# Patient Record
Sex: MALE | Race: BLACK OR AFRICAN AMERICAN | Employment: FULL TIME | URBAN - METROPOLITAN AREA
[De-identification: names, ages, dates, MRNs, and addresses within clinical notes are randomized per-mention and may not be internally consistent; named-entity substitution may affect disease eponyms.]

---

## 2017-07-16 ENCOUNTER — APPOINTMENT (OUTPATIENT)
Dept: GENERAL RADIOLOGY | Age: 27
End: 2017-07-16
Attending: EMERGENCY MEDICINE
Payer: COMMERCIAL

## 2017-07-16 ENCOUNTER — HOSPITAL ENCOUNTER (EMERGENCY)
Age: 27
Discharge: HOME | End: 2017-07-16
Attending: EMERGENCY MEDICINE | Admitting: EMERGENCY MEDICINE
Payer: COMMERCIAL

## 2017-07-16 VITALS
TEMPERATURE: 98.3 F | BODY MASS INDEX: 21.97 KG/M2 | OXYGEN SATURATION: 100 % | HEART RATE: 68 BPM | DIASTOLIC BLOOD PRESSURE: 66 MMHG | RESPIRATION RATE: 15 BRPM | WEIGHT: 140 LBS | SYSTOLIC BLOOD PRESSURE: 142 MMHG | HEIGHT: 67 IN

## 2017-07-16 DIAGNOSIS — S93.402A SPRAIN OF LEFT ANKLE, UNSPECIFIED LIGAMENT, INITIAL ENCOUNTER: Primary | ICD-10-CM

## 2017-07-16 PROCEDURE — 73610 X-RAY EXAM OF ANKLE: CPT

## 2017-07-16 PROCEDURE — 99282 EMERGENCY DEPT VISIT SF MDM: CPT

## 2017-07-16 NOTE — ED PROVIDER NOTES
HPI Comments: The patient is a 24 y.o. male who presents with left proximal ankle pain. 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 He denies any previous ankle injuries in the past.     The history is provided by the patient. No  was used. History reviewed. No pertinent past medical history. History reviewed. No pertinent surgical history. History reviewed. No pertinent family history. Social History     Social History    Marital status: SINGLE     Spouse name: N/A    Number of children: N/A    Years of education: N/A     Occupational History    Not on file. Social History Main Topics    Smoking status: Never Smoker    Smokeless tobacco: Never Used    Alcohol use No    Drug use: No    Sexual activity: Not on file     Other Topics Concern    Not on file     Social History Narrative         ALLERGIES: Review of patient's allergies indicates no known allergies. Review of Systems   Constitutional: Negative for activity change, appetite change, chills, diaphoresis, fatigue, fever and unexpected weight change. Musculoskeletal: Positive for arthralgias. Negative for back pain, gait problem, joint swelling, myalgias, neck pain and neck stiffness. Skin: Negative for color change, pallor, rash and wound. Vitals:    07/16/17 1728   BP: 142/66   Pulse: 68   Resp: 15   Temp: 98.3 °F (36.8 °C)   SpO2: 100%   Weight: 63.5 kg (140 lb)   Height: 5' 7\" (1.702 m)            Physical Exam   Constitutional: He is oriented to person, place, and time. He appears well-developed and well-nourished. Alert and appropriate in no apparent marked discomfort or acute respiratory distress   HENT:   Head: Normocephalic and atraumatic. Right Ear: External ear normal.   Left Ear: External ear normal.   Mouth/Throat: Oropharynx is clear and moist. No oropharyngeal exudate.    Eyes: Conjunctivae and EOM are normal. Pupils are equal, round, and reactive to light. Right eye exhibits no discharge. Left eye exhibits no discharge. No scleral icterus. Neck: Normal range of motion. Neck supple. No tracheal deviation present. No thyromegaly present. Cardiovascular: Normal rate, regular rhythm, normal heart sounds and intact distal pulses. No murmur heard. Pulmonary/Chest: Effort normal and breath sounds normal. No respiratory distress. He has no wheezes. He has no rales. Abdominal: Soft. Bowel sounds are normal. He exhibits no distension. There is no tenderness. There is no rebound and no guarding. Musculoskeletal: Normal range of motion. He exhibits no edema or tenderness. Moderate medial and mild lateral tenderness of left ankle without marked edema or point bony tenderness; no decreased ROM; otherwise neurovascular intact with no mid-foot; proximal fibula or toe tenderness; sensation to light touch intact; +2 pulses and symmetrical; no skin changes or lacerations; no warmth   Lymphadenopathy:     He has no cervical adenopathy. Neurological: He is alert and oriented to person, place, and time. No cranial nerve deficit. Coordination normal.   Skin: Skin is warm. No rash noted. No erythema. Psychiatric: He has a normal mood and affect. His behavior is normal. Judgment and thought content normal.   Nursing note and vitals reviewed. MDM  Number of Diagnoses or Management Options  Sprain of left ankle, unspecified ligament, initial encounter:   Diagnosis management comments: Patient with left ankle discomfort associated with martial arts without radiographic evidence of fracture or focal neurovascular deficit. No associate laceration or joint instability. The patient understands that a subtle or occult bony or ligamentous injury cannot be entirely excluded and will return for any worsening or failure to respond to symptomatic treatment. Precautions given.            Amount and/or Complexity of Data Reviewed  Tests in the radiology section of CPT®: ordered and reviewed    Risk of Complications, Morbidity, and/or Mortality  Presenting problems: moderate  Diagnostic procedures: moderate  Management options: moderate    Patient Progress  Patient progress: stable    ED Course       Procedures      Vitals:  Patient Vitals for the past 12 hrs:   Temp Pulse Resp BP SpO2   07/16/17 1728 98.3 °F (36.8 °C) 68 15 142/66 100 %       Medications ordered:   Medications - No data to display        X-Ray, CT or other radiology findings or impressions:  XR ANKLE LT MIN 3 V    (Results Pending)   XR Ankle Left:   No acute fracture or dislocation. Small rounded dentist. Does not appear to be acute in the anterior ankle. Interpreted by Janis Suggs MD      Disposition:  Diagnosis:   1. Sprain of left ankle, unspecified ligament, initial encounter        Disposition: Discharge Home    Follow-up Information     Follow up With Details Comments [facility] EMERGENCY DEPT  As needed, If symptoms worsen [address] [place] [address] 51    Vero Gordon MD In 1 week As needed, If symptoms are not resolving [address] [address]  [phone]             Patient's Medications   Start Taking    No medications on file   Continue Taking    No medications on file   These Medications have changed    No medications on file   Stop Taking    FLUTICASONE (FLONASE) 50 MCG/ACTUATION NASAL SPRAY    One spray in each nostril daily PRN congestion    HYDROCODONE-ACETAMINOPHEN (NORCO) 5-325 MG PER TABLET    Take 1-2 tablets PO every 4-6 hours as needed for pain control. If over the counter ibuprofen or acetaminophen was suggested, then only take the vicodin for pain not well controlled with the over the counter medication.          Scribe 55 Zahra Osei Keith  acting as a scribe for and in the presence of Janis Suggs MD      July 16, 2017 at 5:39 PM       Provider Attestation:      I personally performed the services described in the documentation, reviewed the documentation, as recorded by the scribe in my presence, and it accurately and completely records my words and actions.      Austyn Goldstein MD      Signed by: Samy Tyson, July 16, 2017 at 5:39 PM

## 2017-07-16 NOTE — DISCHARGE INSTRUCTIONS
May use Tylenol and Motrin  Ice to affected area  NO IMPACT EXERCISE OR SPORTS UNTIL PAIN IS GONE  Return immediately for increasing pain, fever, swelling, disability, discoloration, or any other concerns           Ankle Sprain: Rehab Exercises  Your Care Instructions  Here are some examples of typical rehabilitation exercises for your condition. Start each exercise slowly. Ease off the exercise if you start to have pain. Your doctor or physical therapist will tell you when you can start these exercises and which ones will work best for you. How to do the exercises  \"Alphabet\" exercise    1. Trace the alphabet with your toe. This helps your ankle move in all directions. Side-to-side knee swing exercise    1. Sit in a chair with your foot flat on the floor. 2. Slowly move your knee from side to side. Keep your foot pressed flat. 3. Continue this exercise for 2 to 3 minutes. Towel curl    1. While sitting, place your foot on a towel on the floor. Scrunch the towel toward you with your toes. 2. Then use your toes to push the towel away from you. 3. To make this exercise more challenging you can put something on the other end of the towel. A can of soup is about the right weight for this. Towel stretch    1. Sit with your legs extended and knees straight. 2. Place a towel around your foot just under the toes. 3. Hold each end of the towel in each hand, with your hands above your knees. 4. Pull back with the towel so that your foot stretches toward you. 5. Hold the position for at least 15 to 30 seconds. 6. Repeat 2 to 4 times a session. Do up to 5 sessions a day. Ankle eversion exercise    1. Start by sitting with your foot flat on the floor. Push your foot outward against a wall or a piece of furniture that doesn't move. Hold for about 6 seconds, and relax. Repeat 8 to 12 times. 2. After you feel comfortable with this, try using rubber tubing looped around the outside of your feet for resistance.  Push your foot out to the side against the tubing, and then count to 10 as you slowly bring your foot back to the middle. Repeat 8 to 12 times. Isometric opposition exercises    1. While sitting, put your feet together flat on the floor. 2. Press your injured foot inward against your other foot. Hold for about 6 seconds, and relax. Repeat 8 to 12 times. 3. Then place the heel of your other foot on top of the injured one. Push down with the top heel while trying to push up with your injured foot. Hold for about 6 seconds, and relax. Repeat 8 to 12 times. Resisted ankle inversion    1. Sit on the floor with your good leg crossed over your other leg. 2. Hold both ends of an exercise band and loop the band around the inside of your affected foot. Then press your other foot against the band. 3. Keeping your legs crossed, slowly push your affected foot against the band so that foot moves away from your other foot. Then slowly relax. 4. Repeat 8 to 12 times. Resisted ankle eversion    1. Sit on the floor with your legs straight. 2. Hold both ends of an exercise band and loop the band around the outside of your affected foot. Then press your other foot against the band. 3. Keeping your leg straight, slowly push your affected foot outward against the band and away from your other foot without letting your leg rotate. Then slowly relax. 4. Repeat 8 to 12 times. Resisted ankle dorsiflexion    1. Tie the ends of an exercise band together to form a loop. Attach one end of the loop to a secure object or shut a door on it to hold it in place. (Or you can have someone hold one end of the loop to provide resistance.)  2. While sitting on the floor or in a chair, loop the other end of the band over the top of your affected foot. 3. Keeping your knee and leg straight, slowly flex your foot to pull back on the exercise band, and then slowly relax. 4. Repeat 8 to 12 times. Single-leg balance    1.  Stand on a flat surface with your arms stretched out to your sides like you are making the letter \"T. \" Then lift your good leg off the floor, bending it at the knee. If you are not steady on your feet, use one hand to hold on to a chair, counter, or wall. 2. Standing on the leg with your affected ankle, keep that knee straight. Try to balance on that leg for up to 30 seconds. Then rest for up to 10 seconds. 3. Repeat 6 to 8 times. 4. When you can balance on your affected leg for 30 seconds with your eyes open, try to balance on it with your eyes closed. When you can do this exercise with your eyes closed for 30 seconds and with ease and no pain, try standing on a pillow or piece of foam, and repeat steps 1 through 4. Follow-up care is a key part of your treatment and safety. Be sure to make and go to all appointments, and call your doctor if you are having problems. It's also a good idea to know your test results and keep a list of the medicines you take. Where can you learn more? Go to http://keyana-marcia.info/. Mulugeta Balbuena in the search box to learn more about \"Ankle Sprain: Rehab Exercises. \"  Current as of: March 21, 2017  Content Version: 11.3  © 7108-0903 [employer]. Care instructions adapted under license by [employer] (which disclaims liability or warranty for this information). If you have questions about a medical condition or this instruction, always ask your healthcare professional. [place] [address] any warranty or liability for your use of this information.

## 2017-07-16 NOTE — ED TRIAGE NOTES
Pt with no ambulatory limitations,  Able to move ankles in circles, \"i think I hurt it doing [place]. \"

## 2018-01-04 ENCOUNTER — APPOINTMENT (OUTPATIENT)
Dept: CT IMAGING | Age: 28
End: 2018-01-04
Attending: PHYSICIAN ASSISTANT
Payer: COMMERCIAL

## 2018-01-04 ENCOUNTER — HOSPITAL ENCOUNTER (EMERGENCY)
Age: 28
Discharge: HOME | End: 2018-01-04
Attending: EMERGENCY MEDICINE
Payer: COMMERCIAL

## 2018-01-04 VITALS
HEART RATE: 91 BPM | BODY MASS INDEX: 25.46 KG/M2 | WEIGHT: 168 LBS | HEIGHT: 68 IN | TEMPERATURE: 98.1 F | SYSTOLIC BLOOD PRESSURE: 118 MMHG | OXYGEN SATURATION: 98 % | DIASTOLIC BLOOD PRESSURE: 73 MMHG | RESPIRATION RATE: 16 BRPM

## 2018-01-04 DIAGNOSIS — R10.84 ABDOMINAL PAIN, GENERALIZED: Primary | ICD-10-CM

## 2018-01-04 LAB
ALBUMIN SERPL-MCNC: 4.6 G/DL (ref 3.4–5)
ALBUMIN/GLOB SERPL: 1.4 {RATIO} (ref 0.8–1.7)
ALP SERPL-CCNC: 67 U/L (ref 45–117)
ALT SERPL-CCNC: 47 U/L (ref 16–61)
ANION GAP SERPL CALC-SCNC: 10 MMOL/L (ref 3–18)
APPEARANCE UR: CLEAR
AST SERPL-CCNC: 25 U/L (ref 15–37)
BACTERIA URNS QL MICRO: NEGATIVE /HPF
BASOPHILS # BLD: 0 K/UL (ref 0–0.06)
BASOPHILS NFR BLD: 0 % (ref 0–2)
BILIRUB SERPL-MCNC: 0.8 MG/DL (ref 0.2–1)
BILIRUB UR QL: NEGATIVE
BUN SERPL-MCNC: 18 MG/DL (ref 7–18)
BUN/CREAT SERPL: 15 (ref 12–20)
CALCIUM SERPL-MCNC: 9.3 MG/DL (ref 8.5–10.1)
CHLORIDE SERPL-SCNC: 105 MMOL/L (ref 100–108)
CO2 SERPL-SCNC: 26 MMOL/L (ref 21–32)
COLOR UR: YELLOW
CREAT SERPL-MCNC: 1.21 MG/DL (ref 0.6–1.3)
DIFFERENTIAL METHOD BLD: ABNORMAL
EOSINOPHIL # BLD: 0 K/UL (ref 0–0.4)
EOSINOPHIL NFR BLD: 0 % (ref 0–5)
EPITH CASTS URNS QL MICRO: NEGATIVE /LPF (ref 0–5)
ERYTHROCYTE [DISTWIDTH] IN BLOOD BY AUTOMATED COUNT: 12.7 % (ref 11.6–14.5)
GLOBULIN SER CALC-MCNC: 3.4 G/DL (ref 2–4)
GLUCOSE SERPL-MCNC: 99 MG/DL (ref 74–99)
GLUCOSE UR STRIP.AUTO-MCNC: NEGATIVE MG/DL
HCT VFR BLD AUTO: 46.5 % (ref 36–48)
HGB BLD-MCNC: 15.9 G/DL (ref 13–16)
HGB UR QL STRIP: NEGATIVE
KETONES UR QL STRIP.AUTO: 40 MG/DL
LEUKOCYTE ESTERASE UR QL STRIP.AUTO: NEGATIVE
LIPASE SERPL-CCNC: 81 U/L (ref 73–393)
LYMPHOCYTES # BLD: 0.4 K/UL (ref 0.9–3.6)
LYMPHOCYTES NFR BLD: 5 % (ref 21–52)
MCH RBC QN AUTO: 29.3 PG (ref 24–34)
MCHC RBC AUTO-ENTMCNC: 34.2 G/DL (ref 31–37)
MCV RBC AUTO: 85.8 FL (ref 74–97)
MONOCYTES # BLD: 0.3 K/UL (ref 0.05–1.2)
MONOCYTES NFR BLD: 4 % (ref 3–10)
NEUTS SEG # BLD: 8.1 K/UL (ref 1.8–8)
NEUTS SEG NFR BLD: 91 % (ref 40–73)
NITRITE UR QL STRIP.AUTO: NEGATIVE
PH UR STRIP: >8.5 [PH] (ref 5–8)
PLATELET # BLD AUTO: 150 K/UL (ref 135–420)
PMV BLD AUTO: 11 FL (ref 9.2–11.8)
POTASSIUM SERPL-SCNC: 4 MMOL/L (ref 3.5–5.5)
PROT SERPL-MCNC: 8 G/DL (ref 6.4–8.2)
PROT UR STRIP-MCNC: ABNORMAL MG/DL
RBC # BLD AUTO: 5.42 M/UL (ref 4.7–5.5)
RBC #/AREA URNS HPF: NORMAL /HPF (ref 0–5)
SODIUM SERPL-SCNC: 141 MMOL/L (ref 136–145)
SP GR UR REFRACTOMETRY: >1.03 (ref 1–1.03)
UROBILINOGEN UR QL STRIP.AUTO: 1 EU/DL (ref 0.2–1)
WBC # BLD AUTO: 8.9 K/UL (ref 4.6–13.2)
WBC URNS QL MICRO: NORMAL /HPF (ref 0–4)

## 2018-01-04 PROCEDURE — 81001 URINALYSIS AUTO W/SCOPE: CPT | Performed by: PHYSICIAN ASSISTANT

## 2018-01-04 PROCEDURE — 80053 COMPREHEN METABOLIC PANEL: CPT | Performed by: PHYSICIAN ASSISTANT

## 2018-01-04 PROCEDURE — 83690 ASSAY OF LIPASE: CPT | Performed by: PHYSICIAN ASSISTANT

## 2018-01-04 PROCEDURE — 74011250636 HC RX REV CODE- 250/636: Performed by: PHYSICIAN ASSISTANT

## 2018-01-04 PROCEDURE — 74177 CT ABD & PELVIS W/CONTRAST: CPT

## 2018-01-04 PROCEDURE — 96374 THER/PROPH/DIAG INJ IV PUSH: CPT

## 2018-01-04 PROCEDURE — 99284 EMERGENCY DEPT VISIT MOD MDM: CPT

## 2018-01-04 PROCEDURE — 96361 HYDRATE IV INFUSION ADD-ON: CPT

## 2018-01-04 PROCEDURE — 85025 COMPLETE CBC W/AUTO DIFF WBC: CPT | Performed by: PHYSICIAN ASSISTANT

## 2018-01-04 PROCEDURE — 74011636320 HC RX REV CODE- 636/320: Performed by: EMERGENCY MEDICINE

## 2018-01-04 RX ORDER — ONDANSETRON 2 MG/ML
4 INJECTION INTRAMUSCULAR; INTRAVENOUS
Status: COMPLETED | OUTPATIENT
Start: 2018-01-04 | End: 2018-01-04

## 2018-01-04 RX ORDER — ONDANSETRON 4 MG/1
TABLET, ORALLY DISINTEGRATING ORAL
Qty: 10 TAB | Refills: 0 | Status: SHIPPED | OUTPATIENT
Start: 2018-01-04

## 2018-01-04 RX ADMIN — SODIUM CHLORIDE 1000 ML: 9 INJECTION, SOLUTION INTRAVENOUS at 17:41

## 2018-01-04 RX ADMIN — ONDANSETRON 4 MG: 2 INJECTION INTRAMUSCULAR; INTRAVENOUS at 17:42

## 2018-01-04 RX ADMIN — IOPAMIDOL 94 ML: 612 INJECTION, SOLUTION INTRAVENOUS at 18:59

## 2018-01-04 NOTE — ED PROVIDER NOTES
EMERGENCY DEPARTMENT HISTORY AND PHYSICAL EXAM    5:20 PM      Date: 1/4/2018  Patient Name: Delia Koyanagi. History of Presenting Illness     Chief Complaint   Patient presents with    Vomiting    Diarrhea         History Provided By: Patient    Chief Complaint: abdominal pain  Duration:  Hours  Timing:  Constant  Location: lower abdomen  Quality: Cramping  Severity: Mild  Modifying Factors: eating made worse  Associated Symptoms: nausea, vomiting      Additional History (Context): Delia Koyanagi. is a 24 y.o. male with No significant past medical history and non smoker who presents with abdominal pain that started this morning. States sharp cramping pain. States has nausea, vomiting,diarrhea. Denies fever. States ate eggs and orange juice this morning. Denies previous abdominal problems. [place] PCP: Pam Mejia MD    Current Facility-Administered Medications   Medication Dose Route Frequency Provider Last Rate Last Dose    sodium chloride 0.9 % bolus infusion 1,000 mL  1,000 mL IntraVENous ONCE LAYNE Dean        ondansetron [facility] injection 4 mg  4 mg IntraVENous NOW LAYNE Dean           Past History     Past Medical History:  History reviewed. No pertinent past medical history. Past Surgical History:  History reviewed. No pertinent surgical history. Family History:  History reviewed. No pertinent family history. Social History:  Social History   Substance Use Topics    Smoking status: Never Smoker    Smokeless tobacco: Never Used    Alcohol use No       Allergies:  No Known Allergies      Review of Systems     Constitutional:  Denies malaise, fever, chills. Head:  Denies injury. Face:  Denies injury or pain. ENMT:  Denies sore throat. Neck:  Denies injury or pain. Chest:  Denies injury. Cardiac:  Denies chest pain or palpitations. Respiratory:  Denies cough, wheezing, difficulty breathing, shortness of breath.    GI/ABD:   pain,  nausea, vomiting, diarrhea. :  Denies injury, pain, dysuria or urgency. Back:  Denies injury or pain. Pelvis:  Denies injury or pain. Extremity/MS:  Denies injury or pain. Neuro:  Denies headache, LOC, dizziness, neurologic symptoms/deficits/paresthesias. Skin: Denies injury, rash, itching or skin changes. Physical Exam     Visit Vitals    /72 (BP 1 Location: Right arm, BP Patient Position: At rest)    Pulse 98    Temp 98.1 °F (36.7 °C)    Resp 18    Ht 5' 8\" (1.727 m)    Wt 76.2 kg (168 lb)    SpO2 100%    BMI 25.54 kg/m2       CONSTITUTIONAL: Alert, in no apparent distress; well-developed; well-nourished. HEAD:  Normocephalic, atraumatic. EYES: PERRL; EOM's intact. ENTM: Nose: No rhinorrhea; Throat: mucous membranes moist. Posterior pharynx-normal.  Neck:  No JVD, supple without lymphadenopathy. RESP: Chest clear, equal breath sounds. CV: S1 and S2 WNL; No murmurs, gallops or rubs. GI: Abdomen soft and mild generalized tenderness, +BS, NG, NR  No masses or organomegaly. UPPER EXT:  Normal inspection. LOWER EXT: Normal inspection. NEURO: strength 5/5 and sym, sensation intact. SKIN: No rashes; Normal for age and stage. PSYCH:  Alert and oriented, normal affect. Diagnostic Study Results     Labs -  No results found for this or any previous visit (from the past 12 hour(s)). Radiologic Studies -   No orders to display         Medical Decision Making   I am the first provider for this patient. I reviewed the vital signs, available nursing notes, past medical history, past surgical history, family history and social history. Vital Signs-Reviewed the patient's vital signs.     Pulse Oximetry Analysis -  100 on room air (Interpretation)      Records Reviewed: Nursing Notes and Old Medical Records (Time of Review: 5:20 PM)    ED Course: Progress Notes, Reevaluation, and Consults:  6:13 PM    Consulted with LAYNE Coates concerning patient Farhad Cao., standard discussion of reason for visit, HPI, ROS, PE, and current results available. Report was given at this time and pt was turned over to the provider above, who will assume care of pt at this time and disposition. LAYNE Garcia     Provider Notes (Medical Decision Making):   DDx: gastroenteritis, GERD, hernia, hepatitis, pancreatitis, gallbladder etiology, constipation, adhesions, UTI, pyelo, kidney stones, STD, appendicitis, diverticulitis, SBO, GI bleed, mesenteric ischemia, AAA, cardiac etiology, musculoskeletal pain/spasm, malignancy    6:00 PM  7:30 PM :Pt care assumed from Manish Clay PA-C , ED provider. Pt complaint(s), current treatment plan, progression and available diagnostic results have been discussed thoroughly. Rounding occurred: no  Intended Disposition: Home   Pending diagnostic reports and/or labs (please list): CT abdomen, labs    Doron Urbina PA-C     7:30 PM  Reviewed labs, CT ab/pelvis reviewed and unremarkable  Wbc 8.9, lipase 81, normal lytes. ua clear  CT without obstruction, acute process   Will d/c home at thsi time. Pt is not vomiting, denies pain and here with family to take home. Pt will f/u with GI and pcp and return if worse or as needed        Diagnosis     Clinical Impression: No diagnosis found.   _______________________________

## 2018-01-04 NOTE — ED TRIAGE NOTES
Pt arrived via EMS from home with reports of N/V/D about 3-4 episodes that began about 30 mins after eating eggs this morning approx 0830. Pt has no PMH, no meds. C/o back pain. No reports of urinary problems.

## 2018-01-05 NOTE — DISCHARGE INSTRUCTIONS
Abdominal Pain: Care Instructions  Your Care Instructions    Abdominal pain has many possible causes. Some aren't serious and get better on their own in a few days. Others need more testing and treatment. If your pain continues or gets worse, you need to be rechecked and may need more tests to find out what is wrong. You may need surgery to correct the problem. Don't ignore new symptoms, such as fever, nausea and vomiting, urination problems, pain that gets worse, and dizziness. These may be signs of a more serious problem. Your doctor may have recommended a follow-up visit in the next 8 to 12 hours. If you are not getting better, you may need more tests or treatment. The doctor has checked you carefully, but problems can develop later. If you notice any problems or new symptoms, get medical treatment right away. Follow-up care is a key part of your treatment and safety. Be sure to make and go to all appointments, and call your doctor if you are having problems. It's also a good idea to know your test results and keep a list of the medicines you take. How can you care for yourself at home? · Rest until you feel better. · To prevent dehydration, drink plenty of fluids, enough so that your urine is light yellow or clear like water. Choose water and other caffeine-free clear liquids until you feel better. If you have kidney, heart, or liver disease and have to limit fluids, talk with your doctor before you increase the amount of fluids you drink. · If your stomach is upset, eat mild foods, such as rice, dry toast or crackers, bananas, and applesauce. Try eating several small meals instead of two or three large ones. · Wait until 48 hours after all symptoms have gone away before you have spicy foods, alcohol, and drinks that contain caffeine. · Do not eat foods that are high in fat. · Avoid anti-inflammatory medicines such as aspirin, ibuprofen (Advil, Motrin), and naproxen (Aleve).  These can cause stomach upset. Talk to your doctor if you take daily aspirin for another health problem. When should you call for help? Call 911 anytime you think you may need emergency care. For example, call if:  ? · You passed out (lost consciousness). ? · You pass maroon or very bloody stools. ? · You vomit blood or what looks like coffee grounds. ? · You have new, severe belly pain. ?Call your doctor now or seek immediate medical care if:  ? · Your pain gets worse, especially if it becomes focused in one area of your belly. ? · You have a new or higher fever. ? · Your stools are black and look like tar, or they have streaks of blood. ? · You have unexpected vaginal bleeding. ? · You have symptoms of a urinary tract infection. These may include:  ¨ Pain when you urinate. ¨ Urinating more often than usual.  ¨ Blood in your urine. ? · You are dizzy or lightheaded, or you feel like you may faint. ? Watch closely for changes in your health, and be sure to contact your doctor if:  ? · You are not getting better after 1 day (24 hours). Where can you learn more? Go to http://keyana-marcia.info/. Enter X872 in the search box to learn more about \"Abdominal Pain: Care Instructions. \"  Current as of: March 20, 2017  Content Version: 11.4  © 8742-1591 [employer]. Care instructions adapted under license by [employer] (which disclaims liability or warranty for this information). If you have questions about a medical condition or this instruction, always ask your healthcare professional. [place] [address] any warranty or liability for your use of this information.

## 2018-08-24 ENCOUNTER — HOSPITAL ENCOUNTER (EMERGENCY)
Age: 28
Discharge: HOME | End: 2018-08-24
Attending: EMERGENCY MEDICINE
Payer: COMMERCIAL

## 2018-08-24 VITALS
TEMPERATURE: 98.1 F | OXYGEN SATURATION: 100 % | DIASTOLIC BLOOD PRESSURE: 72 MMHG | RESPIRATION RATE: 15 BRPM | HEART RATE: 60 BPM | SYSTOLIC BLOOD PRESSURE: 120 MMHG

## 2018-08-24 DIAGNOSIS — S16.1XXA STRAIN OF NECK MUSCLE, INITIAL ENCOUNTER: ICD-10-CM
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
## 2018-08-24 PROCEDURE — 99282 EMERGENCY DEPT VISIT SF MDM: CPT
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
## 2018-08-24 NOTE — LETTER
NOTIFICATION RETURN TO WORK / SCHOOL 
 
8/24/2018 6:40 PM 
 
Mr. Javier Smith. 
[address] [facility] [id number] To Whom It May Concern: 
 
Javier Smith. is currently under the care of [facility] EMERGENCY DEPT. He will return to work/school on: 8/26/18 If there are questions or concerns please have the patient contact our office. Sincerely, Nacho Tellez PA-C

## 2023-06-01 ENCOUNTER — OFFICE VISIT (OUTPATIENT)
Age: 33
End: 2023-06-01
Payer: COMMERCIAL

## 2023-06-01 ENCOUNTER — HOSPITAL ENCOUNTER (OUTPATIENT)
Facility: HOSPITAL | Age: 33
Setting detail: SPECIMEN
Discharge: HOME | End: 2023-06-01
Payer: COMMERCIAL

## 2023-06-01 ENCOUNTER — TELEPHONE (OUTPATIENT)
Age: 33
End: 2023-06-01

## 2023-06-01 VITALS
DIASTOLIC BLOOD PRESSURE: 65 MMHG | BODY MASS INDEX: 24.4 KG/M2 | WEIGHT: 151.8 LBS | TEMPERATURE: 98 F | HEIGHT: 66 IN | SYSTOLIC BLOOD PRESSURE: 116 MMHG | HEART RATE: 82 BPM | OXYGEN SATURATION: 98 % | RESPIRATION RATE: 16 BRPM

## 2023-06-01 DIAGNOSIS — Z11.4 ENCOUNTER FOR SCREENING FOR HIV: ICD-10-CM

## 2023-06-01 DIAGNOSIS — E11.9 TYPE 2 DIABETES MELLITUS WITHOUT COMPLICATION, WITHOUT LONG-TERM CURRENT USE OF INSULIN (HCC): ICD-10-CM

## 2023-06-01 DIAGNOSIS — Z11.59 ENCOUNTER FOR HEPATITIS C SCREENING TEST FOR LOW RISK PATIENT: ICD-10-CM

## 2023-06-01 DIAGNOSIS — Z00.00 WELL ADULT EXAM: Primary | ICD-10-CM

## 2023-06-01 DIAGNOSIS — Z13.220 SCREENING FOR HYPERLIPIDEMIA: ICD-10-CM

## 2023-06-01 DIAGNOSIS — Z00.00 WELL ADULT EXAM: ICD-10-CM

## 2023-06-01 LAB
ALBUMIN SERPL-MCNC: 4.1 G/DL (ref 3.4–5)
ALBUMIN/GLOB SERPL: 1.5 (ref 0.8–1.7)
ALP SERPL-CCNC: 76 U/L (ref 45–117)
ALT SERPL-CCNC: 34 U/L (ref 16–61)
ANION GAP SERPL CALC-SCNC: 7 MMOL/L (ref 3–18)
AST SERPL-CCNC: 13 U/L (ref 10–38)
BILIRUB SERPL-MCNC: 0.9 MG/DL (ref 0.2–1)
BUN SERPL-MCNC: 12 MG/DL (ref 7–18)
BUN/CREAT SERPL: 11 (ref 12–20)
CALCIUM SERPL-MCNC: 9.4 MG/DL (ref 8.5–10.1)
CHLORIDE SERPL-SCNC: 97 MMOL/L (ref 100–111)
CHOLEST SERPL-MCNC: 193 MG/DL
CO2 SERPL-SCNC: 28 MMOL/L (ref 21–32)
CREAT SERPL-MCNC: 1.12 MG/DL (ref 0.6–1.3)
ERYTHROCYTE [DISTWIDTH] IN BLOOD BY AUTOMATED COUNT: 11.9 % (ref 11.6–14.5)
EST. AVERAGE GLUCOSE BLD GHB EST-MCNC: ABNORMAL MG/DL
GLOBULIN SER CALC-MCNC: 2.7 G/DL (ref 2–4)
GLUCOSE SERPL-MCNC: 479 MG/DL (ref 74–99)
HBA1C MFR BLD: >14 % (ref 4.2–5.6)
HCT VFR BLD AUTO: 42.5 % (ref 36–48)
HDLC SERPL-MCNC: 59 MG/DL (ref 40–60)
HDLC SERPL: 3.3 (ref 0–5)
HGB BLD-MCNC: 13.9 G/DL (ref 13–16)
LDLC SERPL CALC-MCNC: 103.2 MG/DL (ref 0–100)
LIPID PANEL: ABNORMAL
MCH RBC QN AUTO: 28 PG (ref 24–34)
MCHC RBC AUTO-ENTMCNC: 32.7 G/DL (ref 31–37)
MCV RBC AUTO: 85.7 FL (ref 78–100)
NRBC # BLD: 0 K/UL (ref 0–0.01)
NRBC BLD-RTO: 0 PER 100 WBC
PLATELET # BLD AUTO: 161 K/UL (ref 135–420)
PMV BLD AUTO: 12.5 FL (ref 9.2–11.8)
POTASSIUM SERPL-SCNC: 4 MMOL/L (ref 3.5–5.5)
PROT SERPL-MCNC: 6.8 G/DL (ref 6.4–8.2)
RBC # BLD AUTO: 4.96 M/UL (ref 4.35–5.65)
SODIUM SERPL-SCNC: 132 MMOL/L (ref 136–145)
TRIGL SERPL-MCNC: 154 MG/DL
VLDLC SERPL CALC-MCNC: 30.8 MG/DL
WBC # BLD AUTO: 4.4 K/UL (ref 4.6–13.2)

## 2023-06-01 PROCEDURE — 85027 COMPLETE CBC AUTOMATED: CPT

## 2023-06-01 PROCEDURE — 86803 HEPATITIS C AB TEST: CPT

## 2023-06-01 PROCEDURE — 99385 PREV VISIT NEW AGE 18-39: CPT | Performed by: STUDENT IN AN ORGANIZED HEALTH CARE EDUCATION/TRAINING PROGRAM

## 2023-06-01 PROCEDURE — 80061 LIPID PANEL: CPT

## 2023-06-01 PROCEDURE — 87389 HIV-1 AG W/HIV-1&-2 AB AG IA: CPT

## 2023-06-01 PROCEDURE — 36415 COLL VENOUS BLD VENIPUNCTURE: CPT

## 2023-06-01 PROCEDURE — 83036 HEMOGLOBIN GLYCOSYLATED A1C: CPT

## 2023-06-01 PROCEDURE — 80053 COMPREHEN METABOLIC PANEL: CPT

## 2023-06-01 PROCEDURE — 99203 OFFICE O/P NEW LOW 30 MIN: CPT | Performed by: STUDENT IN AN ORGANIZED HEALTH CARE EDUCATION/TRAINING PROGRAM

## 2023-06-01 SDOH — ECONOMIC STABILITY: HOUSING INSECURITY
IN THE LAST 12 MONTHS, WAS THERE A TIME WHEN YOU DID NOT HAVE A STEADY PLACE TO SLEEP OR SLEPT IN A SHELTER (INCLUDING NOW)?: NO

## 2023-06-01 SDOH — ECONOMIC STABILITY: FOOD INSECURITY: WITHIN THE PAST 12 MONTHS, YOU WORRIED THAT YOUR FOOD WOULD RUN OUT BEFORE YOU GOT MONEY TO BUY MORE.: NEVER TRUE

## 2023-06-01 SDOH — ECONOMIC STABILITY: FOOD INSECURITY: WITHIN THE PAST 12 MONTHS, THE FOOD YOU BOUGHT JUST DIDN'T LAST AND YOU DIDN'T HAVE MONEY TO GET MORE.: NEVER TRUE

## 2023-06-01 SDOH — ECONOMIC STABILITY: INCOME INSECURITY: HOW HARD IS IT FOR YOU TO PAY FOR THE VERY BASICS LIKE FOOD, HOUSING, MEDICAL CARE, AND HEATING?: NOT VERY HARD

## 2023-06-01 ASSESSMENT — PATIENT HEALTH QUESTIONNAIRE - PHQ9
2. FEELING DOWN, DEPRESSED OR HOPELESS: 0
SUM OF ALL RESPONSES TO PHQ9 QUESTIONS 1 & 2: 0
1. LITTLE INTEREST OR PLEASURE IN DOING THINGS: 0
SUM OF ALL RESPONSES TO PHQ QUESTIONS 1-9: 0

## 2023-06-01 ASSESSMENT — ENCOUNTER SYMPTOMS
WHEEZING: 0
RHINORRHEA: 0
TROUBLE SWALLOWING: 0
VOICE CHANGE: 0
CONSTIPATION: 0
COUGH: 0
VOMITING: 0
EYE DISCHARGE: 0
ABDOMINAL PAIN: 0
PHOTOPHOBIA: 0
BACK PAIN: 0
SORE THROAT: 0
NAUSEA: 0
EYE ITCHING: 0
DIARRHEA: 0
SHORTNESS OF BREATH: 0

## 2023-06-01 NOTE — PROGRESS NOTES
1. \"Have you been to the ER, urgent care clinic since your last visit? Hospitalized since your last visit? \" No    2. \"Have you seen or consulted any other health care providers outside of the [address] [place] [address] since your last visit? \" No     3. For patients aged 39-70: Has the patient had a colonoscopy / FIT/ Cologuard?  NA - based on age
membrane, ear canal and external ear normal. There is no impacted cerumen. Left Ear: Tympanic membrane, ear canal and external ear normal. There is no impacted cerumen. Nose: Nose normal. No congestion or rhinorrhea. Mouth/Throat:      Mouth: Mucous membranes are moist.      Pharynx: Oropharynx is clear. No oropharyngeal exudate or posterior oropharyngeal erythema. Eyes:      General: No scleral icterus. Right eye: No discharge. Left eye: No discharge. Extraocular Movements: Extraocular movements intact. Conjunctiva/sclera: Conjunctivae normal.      Pupils: Pupils are equal, round, and reactive to light. Cardiovascular:      Rate and Rhythm: Normal rate and regular rhythm. Pulses: Normal pulses. Heart sounds: Normal heart sounds. No murmur heard. No friction rub. No gallop. Pulmonary:      Effort: Pulmonary effort is normal.      Breath sounds: Normal breath sounds. No wheezing, rhonchi or rales. Abdominal:      General: Abdomen is flat. Bowel sounds are normal.      Palpations: Abdomen is soft. There is no mass. Tenderness: There is no abdominal tenderness. There is no right CVA tenderness or left CVA tenderness. Musculoskeletal:         General: Normal range of motion. Cervical back: Normal range of motion and neck supple. Right lower leg: No edema. Left lower leg: No edema. Lymphadenopathy:      Cervical: No cervical adenopathy. Neurological:      General: No focal deficit present. Mental Status: He is alert and oriented to person, place, and time.    Psychiatric:         Mood and Affect: Mood normal.         Behavior: Behavior normal.         Preventive Care:  Health Maintenance   Topic Date Due    COVID-19 Vaccine (1) Never done    Varicella vaccine (1 of 2 - 2-dose childhood series) Never done    Pneumococcal 0-64 years Vaccine (1 - PCV) Never done    HIV screen  Never done    Diabetic Alb to Cr ratio (uACR) test  Never

## 2023-06-02 ENCOUNTER — TELEPHONE (OUTPATIENT)
Age: 33
End: 2023-06-02

## 2023-06-02 ENCOUNTER — HOSPITAL ENCOUNTER (EMERGENCY)
Facility: HOSPITAL | Age: 33
Discharge: HOME | End: 2023-06-02
Attending: STUDENT IN AN ORGANIZED HEALTH CARE EDUCATION/TRAINING PROGRAM
Payer: COMMERCIAL

## 2023-06-02 VITALS
SYSTOLIC BLOOD PRESSURE: 112 MMHG | HEIGHT: 67 IN | DIASTOLIC BLOOD PRESSURE: 68 MMHG | RESPIRATION RATE: 16 BRPM | OXYGEN SATURATION: 100 % | TEMPERATURE: 97.2 F | WEIGHT: 151 LBS | HEART RATE: 100 BPM | BODY MASS INDEX: 23.7 KG/M2

## 2023-06-02 DIAGNOSIS — R73.9 HYPERGLYCEMIA: Primary | ICD-10-CM

## 2023-06-02 DIAGNOSIS — E11.9 TYPE 2 DIABETES MELLITUS WITHOUT COMPLICATION, WITHOUT LONG-TERM CURRENT USE OF INSULIN (HCC): ICD-10-CM

## 2023-06-02 LAB
ALBUMIN SERPL-MCNC: 4 G/DL (ref 3.4–5)
ALBUMIN/GLOB SERPL: 1.2 (ref 0.8–1.7)
ALP SERPL-CCNC: 79 U/L (ref 45–117)
ALT SERPL-CCNC: 32 U/L (ref 16–61)
ANION GAP SERPL CALC-SCNC: 5 MMOL/L (ref 3–18)
AST SERPL-CCNC: 16 U/L (ref 10–38)
BASOPHILS # BLD: 0 K/UL (ref 0–0.1)
BASOPHILS NFR BLD: 1 % (ref 0–2)
BILIRUB SERPL-MCNC: 0.8 MG/DL (ref 0.2–1)
BUN SERPL-MCNC: 13 MG/DL (ref 7–18)
BUN/CREAT SERPL: 11 (ref 12–20)
CALCIUM SERPL-MCNC: 9.8 MG/DL (ref 8.5–10.1)
CHLORIDE SERPL-SCNC: 101 MMOL/L (ref 100–111)
CO2 SERPL-SCNC: 29 MMOL/L (ref 21–32)
CREAT SERPL-MCNC: 1.2 MG/DL (ref 0.6–1.3)
DIFFERENTIAL METHOD BLD: ABNORMAL
EOSINOPHIL # BLD: 0.1 K/UL (ref 0–0.4)
EOSINOPHIL NFR BLD: 2 % (ref 0–5)
ERYTHROCYTE [DISTWIDTH] IN BLOOD BY AUTOMATED COUNT: 11.7 % (ref 11.6–14.5)
GLOBULIN SER CALC-MCNC: 3.3 G/DL (ref 2–4)
GLUCOSE BLD STRIP.AUTO-MCNC: 362 MG/DL (ref 70–110)
GLUCOSE SERPL-MCNC: 343 MG/DL (ref 74–99)
HCT VFR BLD AUTO: 44.8 % (ref 36–48)
HGB BLD-MCNC: 15.1 G/DL (ref 13–16)
HIV 1+2 AB+HIV1 P24 AG SERPL QL IA: NONREACTIVE
HIV 1/2 RESULT COMMENT: NORMAL
IMM GRANULOCYTES # BLD AUTO: 0 K/UL (ref 0–0.04)
IMM GRANULOCYTES NFR BLD AUTO: 0 % (ref 0–0.5)
LYMPHOCYTES # BLD: 1.4 K/UL (ref 0.9–3.6)
LYMPHOCYTES NFR BLD: 39 % (ref 21–52)
MCH RBC QN AUTO: 28.2 PG (ref 24–34)
MCHC RBC AUTO-ENTMCNC: 33.7 G/DL (ref 31–37)
MCV RBC AUTO: 83.6 FL (ref 78–100)
MONOCYTES # BLD: 0.3 K/UL (ref 0.05–1.2)
MONOCYTES NFR BLD: 9 % (ref 3–10)
NEUTS SEG # BLD: 1.7 K/UL (ref 1.8–8)
NEUTS SEG NFR BLD: 48 % (ref 40–73)
NRBC # BLD: 0 K/UL (ref 0–0.01)
NRBC BLD-RTO: 0 PER 100 WBC
PLATELET # BLD AUTO: 171 K/UL (ref 135–420)
PMV BLD AUTO: 12.1 FL (ref 9.2–11.8)
POTASSIUM SERPL-SCNC: 4.4 MMOL/L (ref 3.5–5.5)
PROT SERPL-MCNC: 7.3 G/DL (ref 6.4–8.2)
RBC # BLD AUTO: 5.36 M/UL (ref 4.35–5.65)
SODIUM SERPL-SCNC: 135 MMOL/L (ref 136–145)
WBC # BLD AUTO: 3.6 K/UL (ref 4.6–13.2)

## 2023-06-02 PROCEDURE — 99283 EMERGENCY DEPT VISIT LOW MDM: CPT

## 2023-06-02 PROCEDURE — 80053 COMPREHEN METABOLIC PANEL: CPT

## 2023-06-02 PROCEDURE — 82962 GLUCOSE BLOOD TEST: CPT

## 2023-06-02 PROCEDURE — 85025 COMPLETE CBC W/AUTO DIFF WBC: CPT

## 2023-06-02 RX ORDER — SODIUM CHLORIDE, SODIUM LACTATE, POTASSIUM CHLORIDE, AND CALCIUM CHLORIDE .6; .31; .03; .02 G/100ML; G/100ML; G/100ML; G/100ML
1000 INJECTION, SOLUTION INTRAVENOUS ONCE
Status: DISCONTINUED | OUTPATIENT
Start: 2023-06-02 | End: 2023-06-02 | Stop reason: HOSPADM

## 2023-06-02 RX ORDER — 0.9 % SODIUM CHLORIDE 0.9 %
1000 INTRAVENOUS SOLUTION INTRAVENOUS ONCE
Status: DISCONTINUED | OUTPATIENT
Start: 2023-06-02 | End: 2023-06-02 | Stop reason: HOSPADM

## 2023-06-02 NOTE — TELEPHONE ENCOUNTER
Received call from lab regarding pt having a blood sugar of 479 when drawn today. Unable to reach pt. Left message on  that call was made.  Will forward to PCP

## 2023-06-02 NOTE — ED NOTES
Patient resting on the stretcher at this time. Chest rise and fall noted. VSS. No further complaints at this time. Will continue to monitor according to facility protocol.        Cuong Carmona RN  06/02/23 3054

## 2023-06-02 NOTE — TELEPHONE ENCOUNTER
Received a critical alert concerning patient's blood glucose being grossly elevated (over 400). Called patient to advise patient to proceed to the ER for further eval.  However, I was unable to reach patient. So I sent patient a text via Zillow.

## 2023-06-02 NOTE — ED PROVIDER NOTES
[facility] EMERGENCY DEPT  EMERGENCY DEPARTMENT ENCOUNTER    Patient Name: Anni Mñuoz MRN: [id number]  YOB: 1990  Provider: Martin Garcia MD  PCP: Chelsea Pappas DO   Time/Date of evaluation: 1:50 PM EDT on 6/2/23    History of Presenting Illness     Chief Complaint   Patient presents with    Hyperglycemia     History Provided by: Patient     History is limited by: Nothing    History Lexis Olmstead):   Anni Muñoz is a 34 y.o. male with a PMHX of diabetes  who presents to the emergency department (room 14) by POV with complaints of elevated blood sugar, increase urine output onset several days ago. Was directed by his pcp to present to ER for labwork, no other symptoms, has follow up Tuesday     He takes metformin    Nursing Notes were all reviewed and agreed with or any disagreements were addressed in the HPI. Past History     Past Medical History:  Past Medical History:   Diagnosis Date    Type 2 diabetes mellitus without complication ([facility] [address]        Past Surgical History:  No past surgical history on file. Family History:  No family history on file. Social History:  Social History     Tobacco Use    Smoking status: Never    Smokeless tobacco: Never   Vaping Use    Vaping Use: Never used   Substance Use Topics    Alcohol use: Yes     Comment: occ    Drug use: Never       Medications:  Current Facility-Administered Medications   Medication Dose Route Frequency Provider Last Rate Last Admin    sodium chloride 0.9% bolus 1,000 mL  1,000 mL IntraVENous Once Danial Lane MD        lactated ringers bolus  1,000 mL IntraVENous Once Danial Lane MD         Current Outpatient Medications   Medication Sig Dispense Refill    metFORMIN (GLUCOPHAGE) 500 MG tablet Take 1 tablet by mouth daily (with breakfast)         Allergies:   Allergies   Allergen Reactions    Seasonal      Dry eyes, itchy eyes, congestion        Social Determinants of Health:  Social Determinants of Health

## 2023-06-02 NOTE — DISCHARGE INSTRUCTIONS
Please carefully read all discharge instructions  Please follow up with your primary care doctor in 1-2 days    Please follow-up with a primary care physician and if you do not have one currently use the contact information provided to obtain an appointment. If none was provided please call the number on the back of your insurance card to locate a Primary care doctor. Many offices have \"cancellation lists\" that you can ask to be placed on; should a patient with an earlier appointment cancel you will be notified to take their place. Please return to the Emergency Room immediately if your symptoms worsen. Please return to the Emergency Department if you develop a fever, chills, cannot eat or drink due to nausea or vomiting, or if any of your symptoms worsen. If you do not have insurance you can use the below for your medications. [email or web address]    What are GoodRx coupons? GoodRx coupons will help you pay less than the cash price for your prescription. [employer] free to use and are accepted at virtually every [place] pharmacy. Your pharmacist will know how to enter the codes on the coupon to pull up the lowest discount available. [facility] Activation    Thank you for requesting access to [facility]. Please follow the instructions below to securely access and download your online medical record. [facility] allows you to send messages to your doctor, view your test results, renew your prescriptions, schedule appointments, and more. How Do I Sign Up? In your internet browser, go to [email or web address]  Click on the First Time User? Click Here link in the Sign In box. You will be redirect to the New Member Sign Up page. Enter your [facility] Access Code exactly as it appears below. You will not need to use this code after youve completed the sign-up process. If you do not sign up before the expiration date, you must request a new code. [facility] Access Code:  Activation code not generated  Current

## 2023-06-02 NOTE — ED TRIAGE NOTES
Patient ambulatory to ED c/o elevated A1C at 14%. Patient has been diabetic since 2020, on metformin 500mg. Triage glucose 362mg/dL; last meal was at 10:30am this morning.

## 2023-06-02 NOTE — ED NOTES
Paperwork read with patient making note of where to  prescriptions (if applicable). IV taken out (if applicable). Armband removed and disposed of in shredder. Patient has no further questions at this time. Will discharge from system.         Kyle Cary RN  06/02/23 5418

## 2023-06-02 NOTE — TELEPHONE ENCOUNTER
Contacted patient per provider request regarding elevated glucose levels, per provider request patient has been advised to go to the ER immediately to be seen and next available was scheduled, pt states understands and will go now

## 2023-06-03 LAB
HCV AB SERPL QL IA: NORMAL
HCV IGG SERPL QL IA: NON REACTIVE S/CO RATIO

## 2023-06-06 ENCOUNTER — TELEPHONE (OUTPATIENT)
Age: 33
End: 2023-06-06

## 2023-06-06 ENCOUNTER — OFFICE VISIT (OUTPATIENT)
Age: 33
End: 2023-06-06
Payer: COMMERCIAL

## 2023-06-06 ENCOUNTER — HOSPITAL ENCOUNTER (OUTPATIENT)
Facility: HOSPITAL | Age: 33
Setting detail: SPECIMEN
Discharge: HOME | End: 2023-06-09
Payer: COMMERCIAL

## 2023-06-06 VITALS
SYSTOLIC BLOOD PRESSURE: 106 MMHG | WEIGHT: 150.8 LBS | BODY MASS INDEX: 23.67 KG/M2 | HEIGHT: 67 IN | DIASTOLIC BLOOD PRESSURE: 68 MMHG | RESPIRATION RATE: 16 BRPM | HEART RATE: 78 BPM | TEMPERATURE: 98 F | OXYGEN SATURATION: 99 %

## 2023-06-06 DIAGNOSIS — E11.65 UNCONTROLLED TYPE 2 DIABETES MELLITUS WITH HYPERGLYCEMIA (HCC): ICD-10-CM

## 2023-06-06 DIAGNOSIS — Z23 ENCOUNTER FOR IMMUNIZATION: ICD-10-CM

## 2023-06-06 DIAGNOSIS — E11.65 UNCONTROLLED TYPE 2 DIABETES MELLITUS WITH HYPERGLYCEMIA (HCC): Primary | ICD-10-CM

## 2023-06-06 DIAGNOSIS — E78.5 HYPERLIPIDEMIA, UNSPECIFIED HYPERLIPIDEMIA TYPE: ICD-10-CM

## 2023-06-06 LAB
CREAT UR-MCNC: 197 MG/DL (ref 30–125)
GLUCOSE, POC: 331 MG/DL
MICROALBUMIN UR-MCNC: 1.5 MG/DL (ref 0–3)
MICROALBUMIN/CREAT UR-RTO: 8 MG/G (ref 0–30)

## 2023-06-06 PROCEDURE — 82043 UR ALBUMIN QUANTITATIVE: CPT

## 2023-06-06 PROCEDURE — 82962 GLUCOSE BLOOD TEST: CPT | Performed by: STUDENT IN AN ORGANIZED HEALTH CARE EDUCATION/TRAINING PROGRAM

## 2023-06-06 PROCEDURE — 3046F HEMOGLOBIN A1C LEVEL >9.0%: CPT | Performed by: STUDENT IN AN ORGANIZED HEALTH CARE EDUCATION/TRAINING PROGRAM

## 2023-06-06 PROCEDURE — 99214 OFFICE O/P EST MOD 30 MIN: CPT | Performed by: STUDENT IN AN ORGANIZED HEALTH CARE EDUCATION/TRAINING PROGRAM

## 2023-06-06 PROCEDURE — 90715 TDAP VACCINE 7 YRS/> IM: CPT | Performed by: STUDENT IN AN ORGANIZED HEALTH CARE EDUCATION/TRAINING PROGRAM

## 2023-06-06 PROCEDURE — 90739 HEPB VACC 2/4 DOSE ADULT IM: CPT | Performed by: STUDENT IN AN ORGANIZED HEALTH CARE EDUCATION/TRAINING PROGRAM

## 2023-06-06 PROCEDURE — 90471 IMMUNIZATION ADMIN: CPT | Performed by: STUDENT IN AN ORGANIZED HEALTH CARE EDUCATION/TRAINING PROGRAM

## 2023-06-06 PROCEDURE — 90472 IMMUNIZATION ADMIN EACH ADD: CPT | Performed by: STUDENT IN AN ORGANIZED HEALTH CARE EDUCATION/TRAINING PROGRAM

## 2023-06-06 PROCEDURE — 82570 ASSAY OF URINE CREATININE: CPT

## 2023-06-06 PROCEDURE — 90677 PCV20 VACCINE IM: CPT | Performed by: STUDENT IN AN ORGANIZED HEALTH CARE EDUCATION/TRAINING PROGRAM

## 2023-06-06 RX ORDER — INSULIN GLARGINE 100 [IU]/ML
15 INJECTION, SOLUTION SUBCUTANEOUS NIGHTLY
Qty: 5 ADJUSTABLE DOSE PRE-FILLED PEN SYRINGE | Refills: 0 | Status: SHIPPED | OUTPATIENT
Start: 2023-06-06 | End: 2023-06-09 | Stop reason: SDUPTHER

## 2023-06-06 RX ORDER — METFORMIN HYDROCHLORIDE 500 MG/1
1000 TABLET, EXTENDED RELEASE ORAL
Qty: 60 TABLET | Refills: 0 | Status: SHIPPED | OUTPATIENT
Start: 2023-06-06

## 2023-06-06 RX ORDER — ATORVASTATIN CALCIUM 40 MG/1
40 TABLET, FILM COATED ORAL DAILY
Qty: 30 TABLET | Refills: 2 | Status: SHIPPED | OUTPATIENT
Start: 2023-06-06

## 2023-06-06 RX ORDER — BLOOD-GLUCOSE SENSOR
EACH MISCELLANEOUS
Qty: 2 EACH | Refills: 3 | Status: SHIPPED | OUTPATIENT
Start: 2023-06-06

## 2023-06-06 ASSESSMENT — ENCOUNTER SYMPTOMS
VOMITING: 0
CONSTIPATION: 0
NAUSEA: 0
RHINORRHEA: 0
DIARRHEA: 0
EYE DISCHARGE: 0
SHORTNESS OF BREATH: 0
WHEEZING: 0
SORE THROAT: 0
COUGH: 0
TROUBLE SWALLOWING: 0
VOICE CHANGE: 0
ABDOMINAL PAIN: 0
PHOTOPHOBIA: 0
BACK PAIN: 0
EYE ITCHING: 0

## 2023-06-06 NOTE — PROGRESS NOTES
1. \"Have you been to the ER, urgent care clinic since your last visit? Hospitalized since your last visit? \" Yes When: 6/2/2023 Where: [facility] Emergency Department  Reason for visit: hyperglycemia     2. \"Have you seen or consulted any other health care providers outside of the [address] [place] [address] since your last visit? \" No     3. For patients aged 39-70: Has the patient had a colonoscopy / FIT/ Cologuard?  NA - based on age
Pulse 78   Temp 98 °F (36.7 °C) (Temporal)   Resp 16   Ht 5' 7\" (1.702 m)   Wt 150 lb 12.8 oz (68.4 kg)   SpO2 99%   BMI 23.62 kg/m²      Physical Exam  Vitals reviewed. Constitutional:       General: He is not in acute distress. Appearance: Normal appearance. He is not ill-appearing, toxic-appearing or diaphoretic. HENT:      Head: Normocephalic and atraumatic. Right Ear: External ear normal.      Left Ear: External ear normal.   Eyes:      General:         Right eye: No discharge. Left eye: No discharge. Cardiovascular:      Rate and Rhythm: Normal rate and regular rhythm. Pulses: Normal pulses. Heart sounds: Normal heart sounds. No murmur heard. No friction rub. No gallop. Pulmonary:      Effort: Pulmonary effort is normal.      Breath sounds: Normal breath sounds. No wheezing, rhonchi or rales. Musculoskeletal:         General: Normal range of motion. Cervical back: Normal range of motion. Skin:     General: Skin is warm. Neurological:      Mental Status: He is alert and oriented to person, place, and time. Mental status is at baseline. Psychiatric:         Mood and Affect: Mood normal.         Behavior: Behavior normal.       Assessment / Plan:   Arben Wallace was seen today for blood sugar problem. Diagnoses and all orders for this visit:    Uncontrolled type 2 diabetes mellitus with hyperglycemia ([address]  Pleasant 34 y.o. with a past medical history of T2DM presenting today. Patient is currently on metformin 500 mg  Patient's home blood sugar monitoring is literally nonexistent.     Point-of-care testing today in the clinic was 331  His most recent A1c   Hemoglobin A1C   Date Value Ref Range Status   06/01/2023 >14.0 (H) 4.2 - 5.6 % Final     Comment:     PERFORMED BY DILUTION  (NOTE)  HbA1C Interpretive Ranges  <5.7              Normal  5.7 - 6.4         Consider Prediabetes  >6.5              Consider Diabetes        Encouraged to be consistent with

## 2023-06-06 NOTE — TELEPHONE ENCOUNTER
Patient called and states his insurance does not cover Continuous Blood Gluc Sensor (FREESTYLE AL 3 SENSOR) [facility] but they do cover dexcom g 7 sensor. His callback number is [phone]. Please advise.

## 2023-06-07 ENCOUNTER — TELEPHONE (OUTPATIENT)
Age: 33
End: 2023-06-07

## 2023-06-07 DIAGNOSIS — E11.65 UNCONTROLLED TYPE 2 DIABETES MELLITUS WITH HYPERGLYCEMIA (HCC): Primary | ICD-10-CM

## 2023-06-07 RX ORDER — ACYCLOVIR 400 MG/1
TABLET ORAL
Qty: 2 EACH | Refills: 3 | Status: SHIPPED | OUTPATIENT
Start: 2023-06-07

## 2023-06-07 NOTE — TELEPHONE ENCOUNTER
Called and spoke to patient to let him know the freestyle Jodi Gelacio is not covered by his insurance. However the dexcom 7 is. I informed the patient that his PCP put in the order for the dexcom and that it should be compatible with his phone. Should he have any question he should let us know. At this time no further questions or concerns.

## 2023-06-09 ENCOUNTER — TELEPHONE (OUTPATIENT)
Age: 33
End: 2023-06-09

## 2023-06-09 DIAGNOSIS — E11.65 UNCONTROLLED TYPE 2 DIABETES MELLITUS WITH HYPERGLYCEMIA (HCC): Primary | ICD-10-CM

## 2023-06-09 DIAGNOSIS — E11.65 UNCONTROLLED TYPE 2 DIABETES MELLITUS WITH HYPERGLYCEMIA (HCC): ICD-10-CM

## 2023-06-09 RX ORDER — INSULIN GLARGINE 100 [IU]/ML
15 INJECTION, SOLUTION SUBCUTANEOUS NIGHTLY
Qty: 5 ADJUSTABLE DOSE PRE-FILLED PEN SYRINGE | Refills: 0 | Status: SHIPPED | OUTPATIENT
Start: 2023-06-09 | End: 2023-06-09 | Stop reason: SDUPTHER

## 2023-06-09 RX ORDER — INSULIN GLARGINE 100 [IU]/ML
15 INJECTION, SOLUTION SUBCUTANEOUS NIGHTLY
Qty: 5 ADJUSTABLE DOSE PRE-FILLED PEN SYRINGE | Refills: 0 | Status: SHIPPED | OUTPATIENT
Start: 2023-06-09

## 2023-06-09 NOTE — TELEPHONE ENCOUNTER
Patient is requesting albuterol inhaler refill. This medication has not been prescribed by current provider. Patient has been schedule for a virtual appointment with PCP on 6/12/2023 at 5:20 PM. Patient also has concerns for possible allergy symptoms that he would like to discuss during that appointment as well. At this time no further questions or concerns.

## 2023-06-09 NOTE — TELEPHONE ENCOUNTER
Per verbal order by Dr. Zonia Mcgovern, patient's requested medication refill for insulin glargine [facility]) 100 unit/ml injection pen has been signed, approved, and sent to patient's pharmacy of choice.

## 2023-06-09 NOTE — TELEPHONE ENCOUNTER
----- Message from Rex Jimenez sent at 6/8/2023  3:53 PM EDT -----  Subject: Message to Provider    QUESTIONS  Information for Provider? PT is returning a call from the office.  Please   call him back at [phone]  ---------------------------------------------------------------------------  --------------  [id number] [employer]  [phone]; OK to leave message on voicemail  ---------------------------------------------------------------------------  --------------  SCRIPT ANSWERS  undefined

## 2023-06-09 NOTE — TELEPHONE ENCOUNTER
Patient Is in need of the needles for the insulin glargine [facility]) 100 UNIT/ML injection pen [[id number]]     Order Details  Dose: 15 Units   Patient stated that the insulin came without the needs. I did not see it on his list so I have listed it here.

## 2023-06-15 ENCOUNTER — HOSPITAL ENCOUNTER (OUTPATIENT)
Facility: HOSPITAL | Age: 33
Setting detail: SPECIMEN
Discharge: HOME | End: 2023-06-18
Payer: COMMERCIAL

## 2023-06-15 DIAGNOSIS — E11.65 UNCONTROLLED TYPE 2 DIABETES MELLITUS WITH HYPERGLYCEMIA (HCC): ICD-10-CM

## 2023-06-15 LAB
ANION GAP SERPL CALC-SCNC: 4 MMOL/L (ref 3–18)
BUN SERPL-MCNC: 16 MG/DL (ref 7–18)
BUN/CREAT SERPL: 18 (ref 12–20)
CALCIUM SERPL-MCNC: 9.6 MG/DL (ref 8.5–10.1)
CHLORIDE SERPL-SCNC: 103 MMOL/L (ref 100–111)
CO2 SERPL-SCNC: 30 MMOL/L (ref 21–32)
CREAT SERPL-MCNC: 0.91 MG/DL (ref 0.6–1.3)
GLUCOSE SERPL-MCNC: 206 MG/DL (ref 74–99)
POTASSIUM SERPL-SCNC: 4.3 MMOL/L (ref 3.5–5.5)
SODIUM SERPL-SCNC: 137 MMOL/L (ref 136–145)

## 2023-06-15 PROCEDURE — 83525 ASSAY OF INSULIN: CPT

## 2023-06-15 PROCEDURE — 84681 ASSAY OF C-PEPTIDE: CPT

## 2023-06-15 PROCEDURE — 86341 ISLET CELL ANTIBODY: CPT

## 2023-06-15 PROCEDURE — 36415 COLL VENOUS BLD VENIPUNCTURE: CPT

## 2023-06-15 PROCEDURE — 80048 BASIC METABOLIC PNL TOTAL CA: CPT

## 2023-06-17 LAB
C PEPTIDE SERPL-MCNC: 0.7 NG/ML (ref 1.1–4.4)
INSULIN SERPL-ACNC: 2.9 UIU/ML (ref 2.6–24.9)

## 2023-06-20 DIAGNOSIS — R06.02 SOB (SHORTNESS OF BREATH): Primary | ICD-10-CM

## 2023-06-20 LAB — GAD65 AB SER-ACNC: <5 U/ML (ref 0–5)

## 2023-06-20 RX ORDER — ALBUTEROL SULFATE 90 UG/1
2 AEROSOL, METERED RESPIRATORY (INHALATION) EVERY 6 HOURS PRN
Qty: 18 G | Refills: 3 | Status: SHIPPED | OUTPATIENT
Start: 2023-06-20 | End: 2023-07-11 | Stop reason: SDUPTHER

## 2023-06-27 ENCOUNTER — PHARMACY VISIT (OUTPATIENT)
Age: 33
End: 2023-06-27

## 2023-06-27 DIAGNOSIS — E11.65 UNCONTROLLED TYPE 2 DIABETES MELLITUS WITH HYPERGLYCEMIA (HCC): Primary | ICD-10-CM

## 2023-06-27 RX ORDER — INSULIN GLARGINE 100 [IU]/ML
18 INJECTION, SOLUTION SUBCUTANEOUS NIGHTLY
Qty: 5 ADJUSTABLE DOSE PRE-FILLED PEN SYRINGE | Refills: 0 | Status: SHIPPED
Start: 2023-06-27

## 2023-06-28 DIAGNOSIS — E11.65 UNCONTROLLED TYPE 2 DIABETES MELLITUS WITH HYPERGLYCEMIA (HCC): ICD-10-CM

## 2023-06-28 RX ORDER — BLOOD-GLUCOSE SENSOR
EACH MISCELLANEOUS
Qty: 2 EACH | Refills: 3 | OUTPATIENT
Start: 2023-06-28

## 2023-06-30 RX ORDER — METFORMIN HYDROCHLORIDE 500 MG/1
1000 TABLET, EXTENDED RELEASE ORAL
Qty: 60 TABLET | Refills: 0 | Status: SHIPPED | OUTPATIENT
Start: 2023-06-30

## 2023-07-06 ENCOUNTER — PHARMACY VISIT (OUTPATIENT)
Age: 33
End: 2023-07-06

## 2023-07-06 DIAGNOSIS — E11.65 UNCONTROLLED TYPE 2 DIABETES MELLITUS WITH HYPERGLYCEMIA (HCC): Primary | ICD-10-CM

## 2023-07-06 NOTE — PROGRESS NOTES
Pharmacy Progress Note - Diabetes Management       Assessment / Plan:   Diabetes Management:  Per ADA guidelines, Pt's A1c is not at goal of < 7%. Pt's basal control appears to be adequate, but variable prandial control without correction has led to prolonged hyperglycemia. He has some occasional hypoglycemia with his current blanket 4 unit dose ac. Will decrease his baseline Novolog dose to 3 units ac tid and add some correction insulin. Will start SSI for Novolo-149: 3 units  150-199: 4 units  200-249: 5 units  > 250: 6 units  Will maintain his Basaglar 18 unit qhs dose. Will reassess with CGM data in 2 weeks. Nutrition/Lifestyle Modifications:  - Educated pt on the importance of moderating carbohydrate intake. Reviewed sources of carbohydrates and method to help determine appropriate portion sizes (e.g., Diabetes Plate Method). - Advised patient to avoid sugar-sweetened beverages and replace with water or diet/zero sugar option.  - Recommend ~30 minutes consistent, moderately intensive, exercise/day or ~150 minutes/week. Start small, stay consistent, and increase length and types of exercise, as tolerated. Patient will return to clinic in 2 week(s) for follow up. S/O: Mr. Chelita Decker., a 34 y.o. male referred by Destiney Vergara DO,  has a past medical history of Type 2 diabetes mellitus without complication ([address] [facility]). Pt was seen today for diabetes management. Patient's last A1c was:   Hemoglobin A1C   Date Value Ref Range Status   2023 >14.0 (H) 4.2 - 5.6 % Final     Comment:     PERFORMED BY DILUTION  (NOTE)  HbA1C Interpretive Ranges  <5.7              Normal  5.7 - 6.4         Consider Prediabetes  >6.5              Consider Diabetes         Interim update: Pt was last seen by me on 2023. Per my prior note: Pt's A1c is not at goal of < 7%. Pt's labs indicate that he does not have JACQUELYN, but rather the beginning stages of late-onset T1DM.   Will likely

## 2023-07-11 ENCOUNTER — OFFICE VISIT (OUTPATIENT)
Age: 33
End: 2023-07-11
Payer: COMMERCIAL

## 2023-07-11 VITALS
SYSTOLIC BLOOD PRESSURE: 116 MMHG | HEART RATE: 80 BPM | WEIGHT: 159 LBS | DIASTOLIC BLOOD PRESSURE: 75 MMHG | HEIGHT: 67 IN | TEMPERATURE: 98.2 F | BODY MASS INDEX: 24.96 KG/M2 | OXYGEN SATURATION: 98 % | RESPIRATION RATE: 16 BRPM

## 2023-07-11 DIAGNOSIS — E11.65 UNCONTROLLED TYPE 2 DIABETES MELLITUS WITH HYPERGLYCEMIA (HCC): Primary | ICD-10-CM

## 2023-07-11 DIAGNOSIS — R06.02 SOB (SHORTNESS OF BREATH): ICD-10-CM

## 2023-07-11 PROCEDURE — 3046F HEMOGLOBIN A1C LEVEL >9.0%: CPT | Performed by: STUDENT IN AN ORGANIZED HEALTH CARE EDUCATION/TRAINING PROGRAM

## 2023-07-11 PROCEDURE — 99214 OFFICE O/P EST MOD 30 MIN: CPT | Performed by: STUDENT IN AN ORGANIZED HEALTH CARE EDUCATION/TRAINING PROGRAM

## 2023-07-11 RX ORDER — ALBUTEROL SULFATE 90 UG/1
2 AEROSOL, METERED RESPIRATORY (INHALATION) EVERY 6 HOURS PRN
Qty: 18 G | Refills: 3 | Status: SHIPPED | OUTPATIENT
Start: 2023-07-11

## 2023-07-11 ASSESSMENT — ENCOUNTER SYMPTOMS
TROUBLE SWALLOWING: 0
EYE DISCHARGE: 0
SORE THROAT: 0
COUGH: 0
PHOTOPHOBIA: 0
RHINORRHEA: 0
VOICE CHANGE: 0
ABDOMINAL PAIN: 0
EYE ITCHING: 0
BACK PAIN: 0
SHORTNESS OF BREATH: 0
WHEEZING: 0
CONSTIPATION: 0
VOMITING: 0
NAUSEA: 0
DIARRHEA: 0

## 2023-07-11 NOTE — PROGRESS NOTES
1. \"Have you been to the ER, urgent care clinic since your last visit? Hospitalized since your last visit? \" No    2. \"Have you seen or consulted any other health care providers outside of the [address] [place] [address] since your last visit? \" No     3. For patients aged 43-73: Has the patient had a colonoscopy / FIT/ Cologuard?  NA - based on age
hub.  Patient has been advised to call back immediately concerns    SOB (shortness of breath)  Comments:  Stable. Plan to albuterol 2 puffs every 6 hours as needed  Orders:  -     albuterol sulfate HFA (PROVENTIL HFA) 108 (90 Base) MCG/ACT inhaler; Inhale 2 puffs into the lungs every 6 hours as needed for Wheezing     Return in about 4 weeks (around 8/8/2023) for Uncontrolled T2DM. Kelly Graff This note was dictated utilizing voice recognition software which may lead to typographical errors. I apologize in advance if the situation occurs. If questions arise please do not hesitate to contact me or call our department.

## 2023-07-12 NOTE — PATIENT INSTRUCTIONS
Patient Education        Type 2 Diabetes: Care Instructions  Your Care Instructions     Type 2 diabetes is a disease that develops when the body's tissues cannot use insulin properly. Over time, the pancreas cannot make enough insulin. Insulin is a hormone that helps the body's cells use sugar (glucose) for energy. It also helps the body store extra sugar in muscle, fat, and liver cells. Without insulin, the sugar cannot get into the cells to do its work. It stays in the blood instead. This can cause high blood sugar levels. A person has diabetes when the blood sugar stays too high too much of the time. Over time, diabetes can lead to diseases of the heart, blood vessels, nerves, kidneys, and eyes. You may be able to control your blood sugar by losing weight, eating a healthy diet, and getting daily exercise. You may also have to take insulin or other diabetes medicine. Follow-up care is a key part of your treatment and safety. Be sure to make and go to all appointments. Call your doctor if you are having problems. It's also a good idea to know your test results and keep a list of the medicines you take. How can you care for yourself at home? Keep your blood sugar at a target level (which you set with your doctor). Carbohydrate--the body's main source of fuel--affects blood sugar more than any other nutrient. Carbohydrate is in fruits, vegetables, milk, and yogurt. It also is in breads, cereals, vegetables such as potatoes and corn, and sugary foods such as candy and cakes. Follow your meal plan to know how much carbohydrate to eat at each meal and snack. Aim for 30 minutes of exercise on most, preferably all, days of the week. Walking is a good choice. You also may want to do other activities, such as running, swimming, cycling, or playing tennis or team sports. Try to do muscle-strengthening exercises at least 2 times a week. Take your medicines exactly as prescribed.  Call your doctor if you think you are

## 2023-07-19 NOTE — PROGRESS NOTES
extended release tablet TAKE 2 TABLETS BY MOUTH DAILY (WITH BREAKFAST). (Patient not taking: Reported on 7/11/2023)    insulin glargine (BASAGLAR KWIKPEN) 100 UNIT/ML injection pen Inject 18 Units into the skin nightly Adjust as directed. Continuous Blood Gluc Sensor (DEXCOM G7 SENSOR) MISC Check blood sugar at least 4x daily and PRN    atorvastatin (LIPITOR) 40 MG tablet Take 1 tablet by mouth daily     No current facility-administered medications for this visit. Allergies: Allergies   Allergen Reactions    Seasonal      Dry eyes, itchy eyes, congestion        Blood Glucose Monitoring (BGM) or CGM:        ROS:  Today, Pt endorses:  - Symptoms of Hyperglycemia: none  - Symptoms of Hypoglycemia: none    Lifestyle modification(s):  - none    Medication Adherence/Access:  - Endorses adherence to current regimen?: Yes    Vitals/Labs:  No results found for: HBA1C  Hemoglobin A1C   Date Value Ref Range Status   06/01/2023 >14.0 (H) 4.2 - 5.6 % Final     Comment:     PERFORMED BY DILUTION  (NOTE)  HbA1C Interpretive Ranges  <5.7              Normal  5.7 - 6.4         Consider Prediabetes  >6.5              Consider Diabetes         Screenings/Prevention Parameters:  -Diabetic Eye and Foot Exams:     There are no preventive care reminders to display for this patient.  -Microalbumin / Creatinine ratio:       Lab Results   Component Value Date/Time    LABCREA 197.00 06/06/2023 10:18 AM    LABMICR 1.50 06/06/2023 10:18 AM     -Immunizations:      Immunization History   Administered Date(s) Administered    Hep B, HEPLISAV-B, (age 18y+), IM, 0.5mL 06/06/2023    Pneumococcal, PCV20, PREVNAR 20, (age 18y+), IM, 0.5mL 06/06/2023    TDaP, ADACEL (age 6y-58y), BOOSTRIX (age 10y+), IM, 0.5mL 06/06/2023       Additional Laboratory Parameters of Interest:   Estimation of renal function:  No results found for: CREA, GFRAA  Wt Readings from Last 3 Encounters:   07/11/23 159 lb (72.1 kg)   06/06/23 150 lb 12.8 oz (68.4 kg)

## 2023-07-20 ENCOUNTER — PHARMACY VISIT (OUTPATIENT)
Age: 33
End: 2023-07-20

## 2023-07-20 DIAGNOSIS — E11.65 UNCONTROLLED TYPE 2 DIABETES MELLITUS WITH HYPERGLYCEMIA (HCC): ICD-10-CM

## 2023-07-24 DIAGNOSIS — E11.65 UNCONTROLLED TYPE 2 DIABETES MELLITUS WITH HYPERGLYCEMIA (HCC): ICD-10-CM

## 2023-07-24 RX ORDER — INSULIN GLARGINE 100 [IU]/ML
18 INJECTION, SOLUTION SUBCUTANEOUS NIGHTLY
Qty: 5 ADJUSTABLE DOSE PRE-FILLED PEN SYRINGE | Refills: 0 | Status: CANCELLED | OUTPATIENT
Start: 2023-07-24

## 2023-08-04 DIAGNOSIS — E11.65 UNCONTROLLED TYPE 2 DIABETES MELLITUS WITH HYPERGLYCEMIA (HCC): ICD-10-CM

## 2023-08-05 RX ORDER — METFORMIN HYDROCHLORIDE 500 MG/1
1000 TABLET, EXTENDED RELEASE ORAL
Qty: 60 TABLET | Refills: 2 | Status: SHIPPED | OUTPATIENT
Start: 2023-08-05

## 2023-08-07 NOTE — PROGRESS NOTES
Allergies: Allergies   Allergen Reactions    Seasonal      Dry eyes, itchy eyes, congestion        Blood Glucose Monitoring (BGM) or CGM:        ROS:  Today, Pt endorses:  - Symptoms of Hyperglycemia: none  - Symptoms of Hypoglycemia: none    Lifestyle modification(s):  - none    Medication Adherence/Access:  - Endorses adherence to current regimen?: Yes    Vitals/Labs:  No results found for: HBA1C  Hemoglobin A1C   Date Value Ref Range Status   06/01/2023 >14.0 (H) 4.2 - 5.6 % Final     Comment:     PERFORMED BY DILUTION  (NOTE)  HbA1C Interpretive Ranges  <5.7              Normal  5.7 - 6.4         Consider Prediabetes  >6.5              Consider Diabetes         Screenings/Prevention Parameters:  -Diabetic Eye and Foot Exams: There are no preventive care reminders to display for this patient.  -Microalbumin / Creatinine ratio:       Lab Results   Component Value Date/Time    LABCREA 197.00 06/06/2023 10:18 AM     -Immunizations:      Immunization History   Administered Date(s) Administered    Hep B, HEPLISAV-B, (age 18y+), IM, 0.5mL 06/06/2023    Pneumococcal, PCV20, PREVNAR 20, (age 18y+), IM, 0.5mL 06/06/2023    TDaP, ADACEL (age 6y-58y), Oscar Charmco (age 10y+), IM, 0.5mL 06/06/2023       Additional Laboratory Parameters of Interest:   Estimation of renal function:  No results found for: CREA, GFRAA  Wt Readings from Last 3 Encounters:   07/11/23 159 lb (72.1 kg)   06/06/23 150 lb 12.8 oz (68.4 kg)   06/02/23 151 lb (68.5 kg)     Ht Readings from Last 1 Encounters:   07/11/23 5' 7\" (1.702 m)     Calculated estimated creatinine clearance: CrCl cannot be calculated (Unknown ideal weight.). Vital Signs Today:    There were no vitals taken for this visit. There are no discontinued medications. No orders of the defined types were placed in this encounter.       Future Appointments   Date Time Provider [address]   8/24/2023  3:30 PM KAYLA Jaemson [facility] AMB   6/3/2024  8:20 AM Daron Anderson

## 2023-08-08 ENCOUNTER — PHARMACY VISIT (OUTPATIENT)
Age: 33
End: 2023-08-08

## 2023-08-08 DIAGNOSIS — E11.65 UNCONTROLLED TYPE 2 DIABETES MELLITUS WITH HYPERGLYCEMIA (HCC): ICD-10-CM

## 2023-08-08 RX ORDER — INSULIN GLARGINE 100 [IU]/ML
22 INJECTION, SOLUTION SUBCUTANEOUS NIGHTLY
Qty: 15 ML | Refills: 0 | Status: SHIPPED | OUTPATIENT
Start: 2023-08-08

## 2023-08-18 DIAGNOSIS — E11.65 UNCONTROLLED TYPE 2 DIABETES MELLITUS WITH HYPERGLYCEMIA (HCC): ICD-10-CM

## 2023-08-18 RX ORDER — ACYCLOVIR 400 MG/1
TABLET ORAL
Qty: 2 EACH | Refills: 3 | OUTPATIENT
Start: 2023-08-18

## 2023-08-21 ENCOUNTER — TELEPHONE (OUTPATIENT)
Age: 33
End: 2023-08-21

## 2023-08-21 DIAGNOSIS — E11.65 UNCONTROLLED TYPE 2 DIABETES MELLITUS WITH HYPERGLYCEMIA (HCC): ICD-10-CM

## 2023-08-21 NOTE — TELEPHONE ENCOUNTER
Patient called to advise that he is unable to check his blood sugar since he is now out of sensors and that last request was denied.  Last rx had 3 refills that should have some left

## 2023-08-22 RX ORDER — ACYCLOVIR 400 MG/1
TABLET ORAL
Qty: 2 EACH | Refills: 3 | Status: SHIPPED | OUTPATIENT
Start: 2023-08-22 | End: 2023-08-24

## 2023-08-23 NOTE — PROGRESS NOTES
Pharmacy Progress Note - Diabetes Management       Assessment / Plan:   Diabetes Management:  Per ADA guidelines, Pt's A1c is not at goal of < 7%. Pt's basal control is adequate with the current Basaglar 22 unit dose from his prior CGM data. He continues to have large post-prandial elevations despite the CIR change at last appointment. Suspect that he was not injecting the correct amount. Encouraged to utilize apps to estimate carb content of his meals and he verbalized understanding. Advised to restart his Novolog tx after starting his new Dexcom G7 sensor. Will reassess with CGM data in 3 weeks. - Continue Basaglar 22 units qhs  - Restart Novolog 1:10 CIR and 1:50 > 150 mg/dL CF    Nutrition/Lifestyle Modifications:  - Educated pt on the importance of moderating carbohydrate intake. Reviewed sources of carbohydrates and method to help determine appropriate portion sizes (e.g., Diabetes Plate Method). - Advised patient to avoid sugar-sweetened beverages and replace with water or diet/zero sugar option.  - Recommend ~30 minutes consistent, moderately intensive, exercise/day or ~150 minutes/week. Start small, stay consistent, and increase length and types of exercise, as tolerated. Patient will return to clinic in 3 week(s) for follow up. S/O: Mr. Mansi Berman., a 34 y.o. male referred by Rafael Aguilera DO,  has a past medical history of Type 2 diabetes mellitus without complication ([address]). Pt was seen today virtually via MyChart video visit for diabetes management. Patient's last A1c was:   Hemoglobin A1C   Date Value Ref Range Status   06/01/2023 >14.0 (H) 4.2 - 5.6 % Final     Comment:     PERFORMED BY DILUTION  (NOTE)  HbA1C Interpretive Ranges  <5.7              Normal  5.7 - 6.4         Consider Prediabetes  >6.5              Consider Diabetes         Interim update: Pt was last seen by me on 8/8/2023. Per my prior note: Pt's A1c is not at goal of < 7%.   Pt's basal control

## 2023-08-24 ENCOUNTER — PHARMACY VISIT (OUTPATIENT)
Age: 33
End: 2023-08-24

## 2023-08-24 DIAGNOSIS — E11.65 UNCONTROLLED TYPE 2 DIABETES MELLITUS WITH HYPERGLYCEMIA (HCC): Primary | ICD-10-CM

## 2023-08-24 RX ORDER — ACYCLOVIR 400 MG/1
TABLET ORAL
Qty: 3 EACH | Refills: 11 | Status: SHIPPED | OUTPATIENT
Start: 2023-08-24

## 2023-09-01 DIAGNOSIS — E78.5 HYPERLIPIDEMIA, UNSPECIFIED HYPERLIPIDEMIA TYPE: ICD-10-CM

## 2023-09-01 NOTE — TELEPHONE ENCOUNTER
Last appointment 7/11/2023 w PCP  8/24/2023 w Wilberto Lane  Next appointment 9/14/2023 with Wilberto Lane  6/3/24 w PCP      Last written 6/6/2023 #30 2 refills

## 2023-09-04 RX ORDER — ATORVASTATIN CALCIUM 40 MG/1
TABLET, FILM COATED ORAL
Qty: 90 TABLET | Refills: 0 | Status: SHIPPED | OUTPATIENT
Start: 2023-09-04

## 2023-09-13 NOTE — PROGRESS NOTES
nightly Adjust as directed. insulin aspart (NOVOLOG) 100 UNIT/ML injection pen Inject 1 unit subcutaneously for every 10 grams of carbohydrates plus correction: 150-199: 1 unit, 200-249: 2 units, >250: 3 units (Patient not taking: Reported on 8/24/2023)    metFORMIN (GLUCOPHAGE-XR) 500 MG extended release tablet TAKE 2 TABLETS BY MOUTH DAILY (WITH BREAKFAST). albuterol sulfate HFA (PROVENTIL HFA) 108 (90 Base) MCG/ACT inhaler Inhale 2 puffs into the lungs every 6 hours as needed for Wheezing    Insulin Pen Needle 32G X 4 MM MISC Use to inject insulin up to 6 times daily     No current facility-administered medications for this visit. Allergies: Allergies   Allergen Reactions    Seasonal      Dry eyes, itchy eyes, congestion        Blood Glucose Monitoring (BGM) or CGM:        ROS:  Today, Pt endorses:  - Symptoms of Hyperglycemia: none  - Symptoms of Hypoglycemia: none    Lifestyle modification(s):  - as above    Medication Adherence/Access:  - Endorses adherence to current regimen?: Yes    Vitals/Labs:  No results found for: \"HBA1C\"  Hemoglobin A1C   Date Value Ref Range Status   06/01/2023 >14.0 (H) 4.2 - 5.6 % Final     Comment:     PERFORMED BY DILUTION  (NOTE)  HbA1C Interpretive Ranges  <5.7              Normal  5.7 - 6.4         Consider Prediabetes  >6.5              Consider Diabetes         Screenings/Prevention Parameters:  -Diabetic Eye and Foot Exams:     There are no preventive care reminders to display for this patient.  -Microalbumin / Creatinine ratio:       Lab Results   Component Value Date/Time    LABCREA 197.00 06/06/2023 10:18 AM     -Immunizations:      Immunization History   Administered Date(s) Administered    Hep B, HEPLISAV-B, (age 18y+), IM, 0.5mL 06/06/2023    Pneumococcal, PCV20, PREVNAR 20, (age 18y+), IM, 0.5mL 06/06/2023    TDaP, ADACEL (age 6y-58y), BOOSTRIX (age 10y+), IM, 0.5mL 06/06/2023       Additional Laboratory Parameters of Interest:   Estimation of renal

## 2023-09-14 ENCOUNTER — PHARMACY VISIT (OUTPATIENT)
Age: 33
End: 2023-09-14

## 2023-09-14 DIAGNOSIS — E11.65 UNCONTROLLED TYPE 2 DIABETES MELLITUS WITH HYPERGLYCEMIA (HCC): ICD-10-CM

## 2023-10-03 ENCOUNTER — PHARMACY VISIT (OUTPATIENT)
Age: 33
End: 2023-10-03

## 2023-10-03 DIAGNOSIS — E11.65 UNCONTROLLED TYPE 2 DIABETES MELLITUS WITH HYPERGLYCEMIA (HCC): ICD-10-CM

## 2023-10-19 ENCOUNTER — PHARMACY VISIT (OUTPATIENT)
Age: 33
End: 2023-10-19

## 2023-10-19 DIAGNOSIS — E11.65 UNCONTROLLED TYPE 2 DIABETES MELLITUS WITH HYPERGLYCEMIA (HCC): ICD-10-CM

## 2023-10-19 RX ORDER — INSULIN GLARGINE 100 [IU]/ML
26 INJECTION, SOLUTION SUBCUTANEOUS NIGHTLY
Qty: 15 ML | Refills: 0 | Status: SHIPPED
Start: 2023-10-19

## 2023-10-30 NOTE — PROGRESS NOTES
Pharmacy Progress Note - Diabetes Management       Assessment / Plan:   Diabetes Management:  Per ADA guidelines, Pt's A1c is not at goal of < 7%. Pt's glycemic control is very poor d/t no longer injecting his Basaglar as well as poor adherence to his Novolog. Will place new Rx for his insulin and have him resume tx with increased adherence. He will resume his Basaglar titration. Will reassess with CGM data in 3 weeks. - Colbert Copa at 30 units qhs and titrate every 3 days by 2 units until FBG < 140 mg/dL  - Continue Novolog 1:6 CIR and 1:50 > 150 mg/dL CF max 3 units      Nutrition/Lifestyle Modifications:  - Educated pt on the importance of moderating carbohydrate intake. Reviewed sources of carbohydrates and method to help determine appropriate portion sizes (e.g., Diabetes Plate Method). - Advised patient to avoid sugar-sweetened beverages and replace with water or diet/zero sugar option.  - Recommend ~30 minutes consistent, moderately intensive, exercise/day or ~150 minutes/week. Start small, stay consistent, and increase length and types of exercise, as tolerated. Patient will return to clinic in 3 week(s) for follow up. S/O: Mr. Francisca Esteban, a 34 y.o. male referred by Yelitza Blackburn DO,  has a past medical history of Type 2 diabetes mellitus without complication ([address] [facility]. Patient's last A1c was:   Hemoglobin A1C   Date Value Ref Range Status   06/01/2023 >14.0 (H) 4.2 - 5.6 % Final     Comment:     PERFORMED BY DILUTION  (NOTE)  HbA1C Interpretive Ranges  <5.7              Normal  5.7 - 6.4         Consider Prediabetes  >6.5              Consider Diabetes         Interim update: Pt was last seen by me on 10/19/2023. Per my prior note: Pt's A1c is not at goal of < 7%. Limited CGM data to assess, but it is clear that his basal control is lacking.   Will have him increase his Basaglar to 32

## 2023-11-02 ENCOUNTER — PHARMACY VISIT (OUTPATIENT)
Age: 33
End: 2023-11-02

## 2023-11-02 DIAGNOSIS — E11.65 UNCONTROLLED TYPE 2 DIABETES MELLITUS WITH HYPERGLYCEMIA (HCC): Primary | ICD-10-CM

## 2023-11-02 RX ORDER — INSULIN GLARGINE 100 [IU]/ML
26 INJECTION, SOLUTION SUBCUTANEOUS NIGHTLY
Qty: 15 ML | Refills: 3 | Status: SHIPPED | OUTPATIENT
Start: 2023-11-02

## 2023-11-21 ENCOUNTER — PHARMACY VISIT (OUTPATIENT)
Age: 33
End: 2023-11-21

## 2023-11-21 ENCOUNTER — TELEPHONE (OUTPATIENT)
Age: 33
End: 2023-11-21

## 2023-11-21 DIAGNOSIS — E11.65 UNCONTROLLED TYPE 2 DIABETES MELLITUS WITH HYPERGLYCEMIA (HCC): Primary | ICD-10-CM

## 2023-11-21 NOTE — TELEPHONE ENCOUNTER
Pharmacy Progress Note - Telephone Call    Mr. Samantha Jurado. 34 y.o. was contacted via an outbound telephone call today to reschedule their missed appointment for PharmD diabetes management and education per referral from Kraig Caba DO. Please call pt to reschedule. Please close encounter after scheduled. If unable to reach pt after 3 documented attempts, please close encounter.     Thank you,    Gadsden Gina, PharmD, BCACP, BC-ADM

## 2023-12-07 ENCOUNTER — TELEPHONE (OUTPATIENT)
Age: 33
End: 2023-12-07

## 2023-12-07 NOTE — TELEPHONE ENCOUNTER
Pharmacy Progress Note - Telephone Call    Mr. Real Gordon. 34 y.o. was contacted via an outbound telephone call today to reschedule their missed appointment for PharmD diabetes management and education per referral from Kaleigh Rangel DO. Please call pt to reschedule. Please close encounter after scheduled. If unable to reach pt after 3 documented attempts, please close encounter.     Thank you,    Uche Hart, PharmD, BCACP, BC-ADM

## 2024-01-21 DIAGNOSIS — E11.65 UNCONTROLLED TYPE 2 DIABETES MELLITUS WITH HYPERGLYCEMIA (HCC): ICD-10-CM

## 2024-01-22 NOTE — TELEPHONE ENCOUNTER
Pharmacy Change:  Last Visit: 7/11/2023   Next Appointment: 6/3/2024  Previous Refill Encounter: 11/02/2023 #15ml with 11 refills    Requested Prescriptions     Pending Prescriptions Disp Refills    insulin aspart (NOVOLOG) 100 UNIT/ML injection pen 15 mL 11     Sig: Inject 1 unit subcutaneously for every 6 grams of carbohydrates plus correction: 150-199: 1 unit, 200-249: 2 units, >250: 3 units

## 2024-01-23 DIAGNOSIS — E11.65 UNCONTROLLED TYPE 2 DIABETES MELLITUS WITH HYPERGLYCEMIA (HCC): ICD-10-CM

## 2024-01-25 DIAGNOSIS — E11.65 UNCONTROLLED TYPE 2 DIABETES MELLITUS WITH HYPERGLYCEMIA (HCC): ICD-10-CM

## 2024-01-29 DIAGNOSIS — E11.65 UNCONTROLLED TYPE 2 DIABETES MELLITUS WITH HYPERGLYCEMIA (HCC): ICD-10-CM

## 2024-01-29 NOTE — TELEPHONE ENCOUNTER
Pharmacy requesting maximum daily dose for patient's medication    Last Visit: 7/11/2023   Next Appointment: 6/3/2024  Previous Refill Encounter: 1/24/2024 #15ml with 3 refills    Requested Prescriptions     Pending Prescriptions Disp Refills    insulin aspart (NOVOLOG) 100 UNIT/ML injection pen 15 mL 3     Sig: Inject 1 unit subcutaneously for every 6 grams of carbohydrates plus correction: 150-199: 1 unit, 200-249: 2 units, >250: 3 units

## 2024-01-30 ENCOUNTER — TELEPHONE (OUTPATIENT)
Age: 34
End: 2024-01-30

## 2024-01-30 NOTE — TELEPHONE ENCOUNTER
Local pharmacy called and patient MRN [id number]. Is on a sliding scale for Novolog they just need a Max daily dose to be sent. Please review and advice.

## 2024-02-06 ENCOUNTER — TELEPHONE (OUTPATIENT)
Age: 34
End: 2024-02-06

## 2024-02-15 DIAGNOSIS — E11.65 UNCONTROLLED TYPE 2 DIABETES MELLITUS WITH HYPERGLYCEMIA (HCC): ICD-10-CM

## 2024-02-16 ENCOUNTER — CLINICAL DOCUMENTATION (OUTPATIENT)
Age: 34
End: 2024-02-16

## 2024-03-12 DIAGNOSIS — E11.65 UNCONTROLLED TYPE 2 DIABETES MELLITUS WITH HYPERGLYCEMIA (HCC): ICD-10-CM

## 2024-03-13 NOTE — TELEPHONE ENCOUNTER
Last Visit: 7/11/2023   Next Appointment: 6/3/2024  Previous Refill Encounter: 11/2/2023 #15 ml with 3 refills    Requested Prescriptions     Pending Prescriptions Disp Refills    BASAGLAR KWIKPEN 100 UNIT/ML injection pen [Pharmacy Med Name: BASAGLAR 100 UNIT/ML KWIKPEN]       Sig: INJECT 15 UNITS INTO THE SKIN NIGHTLY

## 2024-03-20 RX ORDER — INSULIN GLARGINE 100 [IU]/ML
15 INJECTION, SOLUTION SUBCUTANEOUS NIGHTLY
OUTPATIENT
Start: 2024-03-20

## 2024-07-10 PROBLEM — E11.9 DIABETES MELLITUS (HCC): Status: ACTIVE | Noted: 2024-07-10

## 2024-07-10 PROBLEM — E10.9 TYPE 1 DIABETES MELLITUS WITHOUT COMPLICATION (HCC): Status: ACTIVE | Noted: 2024-07-10

## 2024-08-17 DIAGNOSIS — E11.65 UNCONTROLLED TYPE 2 DIABETES MELLITUS WITH HYPERGLYCEMIA (HCC): ICD-10-CM

## 2024-08-18 RX ORDER — INSULIN GLARGINE 100 [IU]/ML
INJECTION, SOLUTION SUBCUTANEOUS
Qty: 15 ML | Refills: 3 | OUTPATIENT
Start: 2024-08-18